# Patient Record
Sex: MALE | Race: BLACK OR AFRICAN AMERICAN
[De-identification: names, ages, dates, MRNs, and addresses within clinical notes are randomized per-mention and may not be internally consistent; named-entity substitution may affect disease eponyms.]

---

## 2017-01-26 ENCOUNTER — HOSPITAL ENCOUNTER (EMERGENCY)
Dept: HOSPITAL 61 - ER | Age: 47
Discharge: HOME | End: 2017-01-26
Payer: MEDICARE

## 2017-01-26 VITALS — SYSTOLIC BLOOD PRESSURE: 146 MMHG | DIASTOLIC BLOOD PRESSURE: 85 MMHG

## 2017-01-26 DIAGNOSIS — F17.210: ICD-10-CM

## 2017-01-26 DIAGNOSIS — Z11.3: Primary | ICD-10-CM

## 2017-01-26 DIAGNOSIS — R20.2: ICD-10-CM

## 2017-01-26 LAB
BACTERIA #/AREA URNS HPF: 0 /HPF
BILIRUB UR QL STRIP: NEGATIVE
GLUCOSE UR STRIP-MCNC: NEGATIVE MG/DL
NITRITE UR QL STRIP: NEGATIVE
PH UR STRIP: 6 [PH]
PROT UR STRIP-MCNC: NEGATIVE MG/DL
RBC #/AREA URNS HPF: (no result) /HPF (ref 0–2)
SP GR UR STRIP: 1.02
UROBILINOGEN UR-MCNC: 1 MG/DL
WBC #/AREA URNS HPF: (no result) /HPF (ref 0–4)

## 2017-01-26 PROCEDURE — 87591 N.GONORRHOEAE DNA AMP PROB: CPT

## 2017-01-26 PROCEDURE — 96372 THER/PROPH/DIAG INJ SC/IM: CPT

## 2017-01-26 PROCEDURE — 81001 URINALYSIS AUTO W/SCOPE: CPT

## 2017-01-26 PROCEDURE — 87491 CHLMYD TRACH DNA AMP PROBE: CPT

## 2017-01-26 PROCEDURE — 99284 EMERGENCY DEPT VISIT MOD MDM: CPT

## 2017-01-26 NOTE — PHYS DOC
Past Medical History


Past Medical History:  No Pertinent History


Past Surgical History:  No Surgical History


Additional Information:  


1 CIGAR PER DAY


Alcohol Use:  Occasionally


Drug Use:  None





Adult General


Chief Complaint


Chief Complaint:  SEXUALLY TRANSMITTED DISEASE





HPI


HPI


Patient is a 46  year old male with history of trichomonas who presents today 

concerned he could have an STD. He states he  has had burning and tingling in 

his penis area for a couple days. Patient denies any fever.





Review of Systems


Review of Systems





Constitutional: Denies fever or chills []


GI: Denies abdominal pain, nausea, vomiting, bloody stools or diarrhea []


: Penis burning and tingling


Musculoskeletal: Denies back pain or joint pain []


Integument: Denies rash or skin lesions []


Neurologic: Denies headache, focal weakness or sensory changes []


Endocrine: Denies polyuria or polydipsia []





Current Medications


Current Medications





 Current Medications








 Medications


  (Trade)  Dose


 Ordered  Sig/Anum  Start Time


 Stop Time Status Last Admin


Dose Admin


 


 Azithromycin


  (Zithromax)  1,000 mg  1X  ONCE  1/26/17 17:45


 1/26/17 18:02 DC  


 


 


 Ceftriaxone Sodium


  (Rocephin Im)  250 mg  1X  ONCE  1/26/17 17:45


 1/26/17 18:02 DC  


 


 


 Metronidazole


  (Flagyl)  2,000 mg  1X  ONCE  1/26/17 17:45


 1/26/17 18:02 DC  


 











Allergies


Allergies





 Allergies








Coded Allergies Type Severity Reaction Last Updated Verified


 


  No Known Drug Allergies    1/26/17 No











Physical Exam


Physical Exam





Constitutional: Well developed, well nourished, no acute distress, non-toxic 

appearance. []


Abdomen: Bowel sounds normal, soft, no tenderness, no masses, no pulsatile 

masses. [] 


Skin: Warm, dry, no erythema, no rash. [] 


Back: No tenderness, no CVA tenderness. [] 


Extremities: No tenderness, no cyanosis, no clubbing, ROM intact, no edema. [] 


Neurologic: Alert and oriented X 3, normal motor function, normal sensory 

function, no focal deficits noted. []


Psychologic: Affect normal, judgement normal, mood normal. []





Current Patient Data


Vital Signs





 Vital Signs








  Date Time  Temp Pulse Resp B/P Pulse Ox O2 Delivery O2 Flow Rate FiO2


 


1/26/17 17:52 98.3 97 18  98 Room Air  





 98.3       








Lab Values





 Laboratory Tests








Test


  1/26/17


17:45


 


Urine Collection Type Unknown  


 


Urine Color Yellow  


 


Urine Clarity Clear  


 


Urine pH 6.0  


 


Urine Specific Gravity 1.025  


 


Urine Protein


  Negativemg/dL


(NEG-TRACE)


 


Urine Glucose (UA)


  Negativemg/dL


(NEG)


 


Urine Ketones (Stick)


  Negativemg/dL


(NEG)


 


Urine Blood


  Negative (NEG)


 


 


Urine Nitrite


  Negative (NEG)


 


 


Urine Bilirubin


  Negative (NEG)


 


 


Urine Urobilinogen Dipstick


  1.0mg/dL (0.2


mg/dL)


 


Urine Leukocyte Esterase


  Negative (NEG)


 


 


Urine RBC Occ/HPF (0-2)  


 


Urine WBC Occ/HPF (0-4)  


 


Urine Bacteria 0/HPF (0-FEW)  


 


Urine Mucus Mod/LPF  











EKG


EKG


[]





Radiology/Procedures


Radiology/Procedures


[]





Course & Med Decision Making


Course & Med Decision Making


Pertinent Labs and Imaging studies reviewed. (See chart for details)





Patient is in the ED with STD concerns. He was treated with Flagyl Rocephin and 

azithromycin. Educated on safe sex practices especially the need to use 

protection at all times. Instructed to contact all his sex partners let them 

know he was treated for STDs and ask them to seek protection. Follow-up with 

the health department for further concerns.





Dragon Disclaimer


Dragon Disclaimer


This electronic medical record was generated, in whole or in part, using a 

voice recognition dictation system.





Departure


Departure


Impression:  


 Primary Impression:  


 Concern about STD in male without diagnosis


Disposition:  01 HOME, SELF-CARE


Condition:  STABLE


Referrals:  


MAX WILKINS (PCP)


 Follow up with the health department.


Patient Instructions:  Sexually Transmitted Disease








NANCY ORDAZ Jan 26, 2017 18:17

## 2017-06-24 ENCOUNTER — HOSPITAL ENCOUNTER (EMERGENCY)
Dept: HOSPITAL 61 - ER | Age: 47
Discharge: HOME | End: 2017-06-24
Payer: MEDICARE

## 2017-06-24 VITALS — WEIGHT: 250 LBS | HEIGHT: 76 IN | BODY MASS INDEX: 30.44 KG/M2

## 2017-06-24 VITALS — DIASTOLIC BLOOD PRESSURE: 86 MMHG | SYSTOLIC BLOOD PRESSURE: 126 MMHG

## 2017-06-24 DIAGNOSIS — R10.84: Primary | ICD-10-CM

## 2017-06-24 DIAGNOSIS — R10.30: ICD-10-CM

## 2017-06-24 DIAGNOSIS — R19.7: ICD-10-CM

## 2017-06-24 LAB
ALBUMIN SERPL-MCNC: 3.9 G/DL (ref 3.4–5)
ALBUMIN/GLOB SERPL: 1 {RATIO} (ref 1–1.7)
ALP SERPL-CCNC: 84 U/L (ref 46–116)
ALT SERPL-CCNC: 30 U/L (ref 16–63)
ANION GAP SERPL CALC-SCNC: 10 MMOL/L (ref 6–14)
AST SERPL-CCNC: 24 U/L (ref 15–37)
BACTERIA #/AREA URNS HPF: 0 /HPF
BASOPHILS # BLD AUTO: 0 X10^3/UL (ref 0–0.2)
BASOPHILS NFR BLD: 0 % (ref 0–3)
BILIRUB SERPL-MCNC: 0.5 MG/DL (ref 0.2–1)
BILIRUB UR QL STRIP: NEGATIVE
BUN SERPL-MCNC: 8 MG/DL (ref 8–26)
BUN/CREAT SERPL: 6 (ref 6–20)
CALCIUM SERPL-MCNC: 9.9 MG/DL (ref 8.5–10.1)
CHLORIDE SERPL-SCNC: 104 MMOL/L (ref 98–107)
CO2 SERPL-SCNC: 27 MMOL/L (ref 21–32)
CREAT SERPL-MCNC: 1.3 MG/DL (ref 0.7–1.3)
EOSINOPHIL NFR BLD: 1 % (ref 0–3)
ERYTHROCYTE [DISTWIDTH] IN BLOOD BY AUTOMATED COUNT: 13.9 % (ref 11.5–14.5)
GFR SERPLBLD BASED ON 1.73 SQ M-ARVRAT: 71.9 ML/MIN
GLOBULIN SER-MCNC: 3.9 G/DL (ref 2.2–3.8)
GLUCOSE SERPL-MCNC: 74 MG/DL (ref 70–99)
GLUCOSE UR STRIP-MCNC: NEGATIVE MG/DL
HCT VFR BLD CALC: 44.2 % (ref 39–53)
HGB BLD-MCNC: 15 G/DL (ref 13–17.5)
LYMPHOCYTES # BLD: 1.9 X10^3/UL (ref 1–4.8)
LYMPHOCYTES NFR BLD AUTO: 16 % (ref 24–48)
MCH RBC QN AUTO: 35 PG (ref 25–35)
MCHC RBC AUTO-ENTMCNC: 34 G/DL (ref 31–37)
MCV RBC AUTO: 103 FL (ref 79–100)
MONOCYTES NFR BLD: 9 % (ref 0–9)
NEUTROPHILS NFR BLD AUTO: 74 % (ref 31–73)
NITRITE UR QL STRIP: NEGATIVE
PH UR STRIP: 6 [PH]
PLATELET # BLD AUTO: 172 X10^3/UL (ref 140–400)
POTASSIUM SERPL-SCNC: 3.5 MMOL/L (ref 3.5–5.1)
PROT SERPL-MCNC: 7.8 G/DL (ref 6.4–8.2)
PROT UR STRIP-MCNC: NEGATIVE MG/DL
RBC # BLD AUTO: 4.29 X10^6/UL (ref 4.3–5.7)
RBC #/AREA URNS HPF: 0 /HPF (ref 0–2)
SODIUM SERPL-SCNC: 141 MMOL/L (ref 136–145)
SP GR UR STRIP: <=1.005
SQUAMOUS #/AREA URNS LPF: (no result) /LPF
UROBILINOGEN UR-MCNC: 0.2 MG/DL
WBC # BLD AUTO: 11.8 X10^3/UL (ref 4–11)
WBC #/AREA URNS HPF: 0 /HPF (ref 0–4)

## 2017-06-24 PROCEDURE — 96361 HYDRATE IV INFUSION ADD-ON: CPT

## 2017-06-24 PROCEDURE — 87324 CLOSTRIDIUM AG IA: CPT

## 2017-06-24 PROCEDURE — 96376 TX/PRO/DX INJ SAME DRUG ADON: CPT

## 2017-06-24 PROCEDURE — 80053 COMPREHEN METABOLIC PANEL: CPT

## 2017-06-24 PROCEDURE — 81001 URINALYSIS AUTO W/SCOPE: CPT

## 2017-06-24 PROCEDURE — 36415 COLL VENOUS BLD VENIPUNCTURE: CPT

## 2017-06-24 PROCEDURE — 87045 FECES CULTURE AEROBIC BACT: CPT

## 2017-06-24 PROCEDURE — 85027 COMPLETE CBC AUTOMATED: CPT

## 2017-06-24 PROCEDURE — 96374 THER/PROPH/DIAG INJ IV PUSH: CPT

## 2017-06-24 PROCEDURE — 83690 ASSAY OF LIPASE: CPT

## 2017-06-24 PROCEDURE — 99285 EMERGENCY DEPT VISIT HI MDM: CPT

## 2017-06-24 PROCEDURE — 96375 TX/PRO/DX INJ NEW DRUG ADDON: CPT

## 2017-06-24 RX ADMIN — FENTANYL CITRATE PRN MCG: 50 INJECTION INTRAMUSCULAR; INTRAVENOUS at 20:32

## 2017-06-24 RX ADMIN — FENTANYL CITRATE PRN MCG: 50 INJECTION INTRAMUSCULAR; INTRAVENOUS at 21:54

## 2017-06-24 NOTE — PHYS DOC
Past Medical History


Past Medical History:  No Pertinent History


Past Surgical History:  No Surgical History


Alcohol Use:  Occasionally


Drug Use:  None





Adult General


Chief Complaint


Chief Complaint:  ABDOMINAL PAIN





HPI


HPI





Patient is a 47  year old male who presents with abdominal pain and diarrhea. 

The patient reports one week history of frequent loose bowel movements 

associated with lower abdominal pain and cramping. Reports nausea. Denies 

fevers or chills, vomiting, hematemesis, hematochezia or melena, dysuria or 

hematuria. Denies history of abdominal surgeries. Previously healthy. States he 

was seen at [facility] at time of onset of symptoms, given 

unknown diagnosis and told to take Cipro but states symptoms have not improved. 

Does not have a PCP.





Review of Systems


Review of Systems


Constitutional: Denies fever or chills 


Eyes: Denies change in visual acuity


HENT: Denies nasal congestion or sore throat 


Respiratory: Denies cough or shortness of breath 


Cardiovascular:  Denies chest pain or edema


GI: Reports abdominal pain, nausea, and diarrhea, denies vomiting, bloody 

stools 


: Denies dysuria or hematuria 


Musculoskeletal: Denies back pain or joint pain 


Integument: Denies rash or skin lesions 


Neurologic: Denies headache, focal weakness or sensory changes





Current Medications


Current Medications





Current Medications








 Medications


  (Trade)  Dose


 Ordered  Sig/Anum  Start Time


 Stop Time Status Last Admin


Dose Admin


 


 Fentanyl Citrate


  (Fentanyl 2ml


 Vial)  50 mcg  PRN Q15MIN  PRN  6/24/17 20:15


 6/25/17 20:14  6/24/17 21:54


50 MCG


 


 Info


  (Do NOT chart on


 this entry -- for


 MONITORING)  1 each  PRN DAILY  PRN  6/24/17 21:30


 6/26/17 21:29   


 


 


 Iohexol


  (Omnipaque 300


 Mg/ml)  75 ml  1X  ONCE  6/24/17 21:45


 6/24/17 21:46 DC  


 


 


 Ondansetron HCl


  (Zofran)  4 mg  1X  ONCE  6/24/17 20:30


 6/24/17 20:31 DC 6/24/17 20:29


4 MG


 


 Sodium Chloride  1,000 ml @ 


 1,000 mls/hr  1X  ONCE  6/24/17 20:30


 6/24/17 21:29 DC 6/24/17 20:33


1,000 MLS/HR











Allergies


Allergies





Allergies








Coded Allergies Type Severity Reaction Last Updated Verified


 


  No Known Drug Allergies    1/26/17 No











Physical Exam


Physical Exam


Constitutional: Well developed, well nourished, no acute distress, non-toxic 

appearance. 


HENT: Normocephalic, atraumatic, bilateral external ears normal, oropharynx 

moist, nose normal.


Eyes: PERRLA, EOMI, conjunctiva normal, no discharge.


Neck: supple, no stridor.


Cardiovascular:  RRR, no murmurs, no edema. 


Lungs & Thorax:  LCTAB, no wheezing, no respiratory distress.


Abdomen: soft, generalized lower abdominal tenderness without rebound or 

guarding, no masses or pulsatile masses, nondistended.


Skin: Warm, dry, no erythema, no rash.


Back: No CVA tenderness.


Extremities: No tenderness, no edema. 


Neurologic: Alert and oriented X 3, no focal deficits noted. 


Psychologic: Affect normal, judgement normal, mood normal.





Current Patient Data


Vital Signs





 Vital Signs








  Date Time  Temp Pulse Resp B/P (MAP) Pulse Ox O2 Delivery O2 Flow Rate FiO2


 


6/24/17 21:54   20  97 Room Air  


 


6/24/17 21:00  83  127/81 (96)    


 


6/24/17 19:45 98.6       





 98.6       








Lab Values





 Laboratory Tests








Test


  6/24/17


20:23 6/24/17


20:25


 


Urine Collection Type Unknown   


 


Urine Color Yellow   


 


Urine Clarity Clear   


 


Urine pH 6.0   


 


Urine Specific Gravity <=1.005   


 


Urine Protein


  Negative mg/dL


(NEG-TRACE) 


 


 


Urine Glucose (UA)


  Negative mg/dL


(NEG) 


 


 


Urine Ketones (Stick)


  Negative mg/dL


(NEG) 


 


 


Urine Blood


  Negative (NEG)


  


 


 


Urine Nitrite


  Negative (NEG)


  


 


 


Urine Bilirubin


  Negative (NEG)


  


 


 


Urine Urobilinogen Dipstick


  0.2 mg/dL (0.2


mg/dL) 


 


 


Urine Leukocyte Esterase


  Negative (NEG)


  


 


 


Urine RBC 0 /HPF (0-2)   


 


Urine WBC 0 /HPF (0-4)   


 


Urine Squamous Epithelial


Cells Few /LPF  


  


 


 


Urine Bacteria


  0 /HPF (0-FEW)


  


 


 


Urine Mucus Slight /LPF   


 


White Blood Count


  


  11.8 x10^3/uL


(4.0-11.0)  H


 


Red Blood Count


  


  4.29 x10^6/uL


(4.30-5.70)  L


 


Hemoglobin


  


  15.0 g/dL


(13.0-17.5)


 


Hematocrit


  


  44.2 %


(39.0-53.0)


 


Mean Corpuscular Volume


  


  103 fL


()  H


 


Mean Corpuscular Hemoglobin  35 pg (25-35)  


 


Mean Corpuscular Hemoglobin


Concent 


  34 g/dL


(31-37)


 


Red Cell Distribution Width


  


  13.9 %


(11.5-14.5)


 


Platelet Count


  


  172 x10^3/uL


(140-400)


 


Neutrophils (%) (Auto)  74 % (31-73)  H


 


Lymphocytes (%) (Auto)  16 % (24-48)  L


 


Monocytes (%) (Auto)  9 % (0-9)  


 


Eosinophils (%) (Auto)  1 % (0-3)  


 


Basophils (%) (Auto)  0 % (0-3)  


 


Neutrophils # (Auto)


  


  8.7 x10^3uL


(1.8-7.7)  H


 


Lymphocytes # (Auto)


  


  1.9 x10^3/uL


(1.0-4.8)


 


Monocytes # (Auto)


  


  1.0 x10^3/uL


(0.0-1.1)


 


Eosinophils # (Auto)


  


  0.1 x10^3/uL


(0.0-0.7)


 


Basophils # (Auto)


  


  0.0 x10^3/uL


(0.0-0.2)


 


Sodium Level


  


  141 mmol/L


(136-145)


 


Potassium Level


  


  3.5 mmol/L


(3.5-5.1)


 


Chloride Level


  


  104 mmol/L


()


 


Carbon Dioxide Level


  


  27 mmol/L


(21-32)


 


Anion Gap  10 (6-14)  


 


Blood Urea Nitrogen


  


  8 mg/dL (8-26)


 


 


Creatinine


  


  1.3 mg/dL


(0.7-1.3)


 


Estimated GFR


(Cockcroft-Gault) 


  71.9  


 


 


BUN/Creatinine Ratio  6 (6-20)  


 


Glucose Level


  


  74 mg/dL


(70-99)


 


Calcium Level


  


  9.9 mg/dL


(8.5-10.1)


 


Total Bilirubin


  


  0.5 mg/dL


(0.2-1.0)


 


Aspartate Amino Transferase


(AST) 


  24 U/L (15-37)


 


 


Alanine Aminotransferase (ALT)


  


  30 U/L (16-63)


 


 


Alkaline Phosphatase


  


  84 U/L


()


 


Total Protein


  


  7.8 g/dL


(6.4-8.2)


 


Albumin


  


  3.9 g/dL


(3.4-5.0)


 


Albumin/Globulin Ratio  1.0 (1.0-1.7)  


 


Lipase


  


  90 U/L


()





 Laboratory Tests


6/24/17 20:25








 Laboratory Tests


6/24/17 20:25














Course & Med Decision Making


Course & Med Decision Making


Pertinent Labs and Imaging studies reviewed. (See chart for details)


The patient presents with diarrhea and abdominal pain. Afebrile here with 

stable vital signs. Gave IV fluids, Zofran, pain medication. Attempted to 

obtain records from [facility]. Received a response back 

that they have no records for patient by this name. Labs as above with 

leukocytosis otherwise no significant electrolyte abnormalities. The patient 

provided a stool sample which is sent for culture. Ordered CT of the abdomen 

and pelvis which is pending at the end of my shift. Care transferred to Dr. Beal to follow-up results and disposition patient accordingly. I anticipate he 

can be discharged home with supportive treatment unless a significant finding 

identified. He is in stable condition at the end of my shift.


Geraldo Montez MD








Accepted care from Dr. Monetz at shift change.  Results for CT abd/pelvis 

with Oral and IV contrast dated 6/21/17 were received from [facility] after further 

attempt.  Results show no acute intra-abdominal or pelvic process, normal 

appendix, normal caliber bowel, normal vascular structures, no gallstones, no 

hydronephrosis.  His laboratory evaluation is unremarkable. Stool studies 

pending at this time.  He is feeling better after medications. Thus, imaging 

was canceled for this evaluation. Discussed supportive care and trial of 

flagyl.  Return precautions given.  He understands and agrees with plan.  -

MD Alexis Odell Disclaimer


Alexis Disclaimer


This electronic medical record was generated, in whole or in part, using a 

voice recognition dictation system.





Departure


Departure


Impression:  


 Primary Impression:  


 Abdominal pain


 Additional Impression:  


 Diarrhea


Disposition:  01 HOME, SELF-CARE


Admitting Physician:  Bijal Phillips


Condition:  STABLE


Referrals:  


MAX WILKINS (PCP)


Patient Instructions:  Abdominal Pain, Easy-to-Read, Diet for Diarrhea, Adult





Additional Instructions:  


Take Flagyl to help with diarrhea. Take bentyl to help with abdominal pain.  

Take Tylenol or ibuprofen as needed for moderate pain. Take hydrocodone as 

needed for severe pain. Do not drink, drive or operate heavy machinery after 

taking hydrocodone as it may make you sleepy. Follow-up with your primary care 

doctor. Return for any concerns.


Scripts


Dicyclomine Hcl (BENTYL) 10 Mg Capsule


1 CAP PO TID, #30 CAP 0 Refills


   Prov: ANAM BEAL MD         6/24/17 


Hydrocodone/Ibuprofen (HYDROCODONE-IBUPROFEN 5-200 MG) 1 Each Tablet


1-2 TAB PO PRN Q6HRS Y for PAIN, #10 TAB 0 Refills


   Prov: ANAM BEAL MD         6/24/17 


Metronidazole (FLAGYL) 500 Mg Tablet


1 TAB PO BID, #20 TAB


   Prov: ANAM BEAL MD         6/24/17





Problem Qualifiers








 Primary Impression:  


 Abdominal pain


 Abdominal location:  unspecified location  Qualified Codes:  R10.9 - 

Unspecified abdominal pain


 Additional Impression:  


 Diarrhea


 Diarrhea type:  presumed infectious  Qualified Codes:  A09 - Infectious 

gastroenteritis and colitis, unspecified








GERALDO MONTEZ MD Jun 24, 2017 21:02


ANAM BEAL MD Jun 24, 2017 22:23

## 2018-04-24 ENCOUNTER — HOSPITAL ENCOUNTER (EMERGENCY)
Dept: HOSPITAL 61 - ER | Age: 48
Discharge: HOME | End: 2018-04-24
Payer: MEDICARE

## 2018-04-24 DIAGNOSIS — Z98.890: ICD-10-CM

## 2018-04-24 DIAGNOSIS — M54.5: Primary | ICD-10-CM

## 2018-04-24 PROCEDURE — 96372 THER/PROPH/DIAG INJ SC/IM: CPT

## 2018-04-24 PROCEDURE — 99283 EMERGENCY DEPT VISIT LOW MDM: CPT

## 2018-04-24 RX ADMIN — LIDOCAINE 1 PATCH: 50 PATCH CUTANEOUS at 11:43

## 2018-04-24 RX ADMIN — KETOROLAC TROMETHAMINE 1 MG: 30 INJECTION, SOLUTION INTRAMUSCULAR at 11:44

## 2018-06-07 ENCOUNTER — HOSPITAL ENCOUNTER (EMERGENCY)
Dept: HOSPITAL 61 - ER | Age: 48
Discharge: HOME | End: 2018-06-07
Payer: MEDICARE

## 2018-06-07 DIAGNOSIS — M54.5: ICD-10-CM

## 2018-06-07 DIAGNOSIS — G89.29: Primary | ICD-10-CM

## 2018-06-07 PROCEDURE — 99283 EMERGENCY DEPT VISIT LOW MDM: CPT

## 2018-06-07 PROCEDURE — 96372 THER/PROPH/DIAG INJ SC/IM: CPT

## 2018-06-07 RX ADMIN — MORPHINE SULFATE 1 MG: 10 INJECTION INTRAMUSCULAR; INTRAVENOUS; SUBCUTANEOUS at 10:24

## 2020-05-13 ENCOUNTER — HOSPITAL ENCOUNTER (OUTPATIENT)
Dept: HOSPITAL 35 - PAIN | Age: 50
End: 2020-05-13
Payer: COMMERCIAL

## 2020-05-13 VITALS — DIASTOLIC BLOOD PRESSURE: 91 MMHG | SYSTOLIC BLOOD PRESSURE: 134 MMHG

## 2020-05-13 VITALS — BODY MASS INDEX: 31.66 KG/M2 | WEIGHT: 260 LBS | HEIGHT: 75.98 IN

## 2020-05-13 DIAGNOSIS — M54.17: Primary | ICD-10-CM

## 2020-05-13 DIAGNOSIS — F11.20: ICD-10-CM

## 2020-05-13 DIAGNOSIS — Z79.899: ICD-10-CM

## 2020-05-13 DIAGNOSIS — F17.210: ICD-10-CM

## 2020-05-13 DIAGNOSIS — I10: ICD-10-CM

## 2020-05-13 NOTE — NUR
Pain Clinic Assessment:
 
1. History of Osteoarthritis:
BACK
   History of Rheumatoid Arthritis:
Not Applicable
 
2. Height: 6 ft. 4 in. 193.0 cm.
   Weight: 260.0 lb.  oz. 117.936 kg.
   Patient's BMI: 31.7
 
3. Vital Signs:
   BP: 134/91 Pulse: 78 Resp: 16
   Temp:  02 Sat: 100 ECG Mon:
 
4. Pain Intensity: 5
 
5. Fall Risk:
   Dizziness: N  Needs help standing or walking: N
   Fallen in the last 3 months: N
   Fall risk comments:
 
 
6. Patient on Blood Thinner: None
 
7. History of Hypertension: N
 
8. Opioid Therapy greater than 6 weeks: N
   Opiate Contract Signed:
 
9. Risk Assessment Tool Provided: LOW RISK 0/3
 
10. Functional Assessment Tool: 48/70
 
11. Recreational Drug Use: Never Drug Type:
    Tobacco Use: Current Some Day Smoker Tobacco Type: Cigars
       Amount or Packs/day: 1-2 PER WEEK How Many Years: 10
    Alcohol Use: Yes  Frequency: Weekly Quant: 1-2

## 2020-05-20 NOTE — HPC
[facility]
[address]
[place]   [address]                     PAIN MANAGEMENT CONSULTATION  
_______________________________________________________________________________
 
Name:       TOO ZENG                    Room #:                     REG KELLIE 
BHARAT#:      [id number]                       Account #:      [id number]  
Admission:  05/13/20    Attend Phys:    FANY Arauz MD    
Discharge:              Date of Birth:  [birth year]  
                                                          Report #: [id number]
                                                                    [id number]   
_______________________________________________________________________________
THIS REPORT FOR:  
 
cc:  Kwabena Nelson MD, Brian G. MD Brown,FANY Merrill MD                                            ~
CC: Kwabena Arauz
 
DATE OF SERVICE:  05/13/2020
 
 
CHIEF COMPLAINT:  "Low back pain with pain down into my leg."
 
HISTORY:  The patient is a 50-year-old gentleman, who has been referred to the
Pain Clinic because of back pain.  He has had pain, which has been problematic
since 2018.  He has been experiencing more lower back pain and discomfort. 
Notes that the pain is worse when he is standing.  He has pain when he is lying
down.  He has, in the past, undergone epidural steroid injections and found them
beneficial.  He has history of disk disease at a number of levels, they involve
the facet, there is arthritis in these areas.  He also has some narrowing in the
lower portion of his spine, which places pressure on the nerves.  He denies any
new problems with bowel or bladder function.  He has tried muscle relaxants.  He
has used nonsteroidal anti-inflammatory medications.  This episode of pain has
been problematic since 2008.  He initially noticed worsening of his pain in
2011.  He denied any trauma at that juncture.  Epidural steroid injections in
2016 were helpful.  He has undergone chiropractic therapy in the past.  He has
used hot and cold patches.
 
ALLERGIES:  No known drug allergies.
 
CURRENT MEDICATIONS:  Trazodone 100 mg at bedtime, ibuprofen 800 mg.
 
PAST MEDICAL HISTORY:  Generally unremarkable.
 
PAST SURGICAL HISTORY:  No surgical history.
 
SOCIAL HISTORY:  He works as a .  He is working at this
juncture.
 
REVIEW OF SYSTEMS:  Generally good health, wears glasses, numbness and tingling
sensation in his leg.
 
LABORATORY DATA:  MRI of the lumbar spine dated 05/02/2018:
1.  L3-L4, there is no significant posterior disk bulge or herniation.  There is
no spinal canal stenosis.  There is minimal broad-based disk bulging in both
lateral and subarticular zones.  There is mild bilateral neural foraminal
 
 
 
[place] [address]                     PAIN MANAGEMENT CONSULTATION  
_______________________________________________________________________________
 
Name:       AZUCENATOO                    Room #:                     REG CL 
BHARAT#:      [id number]                       Account #:      [id number]  
Admission:  05/13/20    Attend Phys:    FANY Arauz MD    
Discharge:              Date of Birth:  [birth year]  
                                                          Report #: [id number]
                                                                    [id number]   
_______________________________________________________________________________
stenosis.
2.  L4-L5, there is no significant posterior disk bulge or herniation.  There is
no spinal canal stenosis.  There is minimal broad-based disk bulging in both
lateral and subarticular zones.  Mild right facet arthrosis.  There is mild
bilateral neural foraminal stenosis.
3.  L5-S1, mild to moderate diffuse disk bulge and disk osteophyte complex.  No
spinal canal stenosis.  Mild bilateral facet arthrosis.  Moderate left and
moderate to severe right neural foraminal stenosis.
 
PAIN CLINIC ASSESSMENT AND PQRS:
1.  The patient has some osteoarthritic changes in his back.  He is not being
treated for rheumatoid arthritis.
2.  Height 6 feet 4 inches, weight 260 pounds, BMI is 31.7.
3.  Vital signs:  Blood pressure 134/91, heart rate 78, respiratory rate 16,
room air saturation 100%.
4.  Pain intensity:  5/10.
5.  Fall history:  The patient has not fallen.
6.  Blood thinner:  The patient is not on a blood thinning medication.
7.  Hypertension:  The patient is not being treated for hypertension.
8.  Opioids greater than 6 weeks:  The patient is not on an opioid regimen.
9.  Risk assessment tool:  Low for opioid use.
10.  Functional assessment tool:  48/70.
11.  Recreational drug use:  The patient denies.
12.  Tobacco:  The patient smokes 1-2 cigars per week.  He has smoked for about
10 years.
13.  Alcohol:  The patient drinks 1-2 alcoholic beverages weekly.
 
PHYSICAL EXAMINATION:
GENERAL:  The patient is a well-developed, well-nourished, black male.  He
appears his stated age.  He is alert and oriented x 3.  His affect is
appropriate.  Speech is fluent.
HEENT:  Normocephalic, atraumatic.  Extraocular eye muscles intact.  Sclerae
nonicteric.  Mucous membranes are moist.
NECK:  Without adenopathy or JVD.  The patient has a mask in place.
HEART:  Regular rate.
CHEST:  Clear to auscultation.
ABDOMEN:  Nontender.
MUSCULOSKELETAL:  Upper extremity muscle strength is judged to be 5/5 for the
major muscle groups in the upper extremity.  Lower extremity, the patient's
muscle strength is judged to be 5/5 for the major muscle groups in the lower
extremity.  The patient has pain and discomfort in the lower portion of his back
with pain radiating down the L5-S1 dermatomal distribution on the right leg.  He
notes some perception of weakness, some numbness and tingling sensory changes.
 
IMPRESSION:  Lumbar radiculopathy with L5-S1 dermatomal distribution.
 
 
 
 
[facility]
[address] [place]   [address]                     PAIN MANAGEMENT CONSULTATION  
_______________________________________________________________________________
 
Name:       TOO ZENG                    Room #:                     REG CLI 
BHARAT#:      [id number]                       Account #:      [id number]  
Admission:  05/13/20    Attend Phys:    FANY Arauz MD    
Discharge:              Date of Birth:  [birth year]  
                                                          Report #: [id number]
                                                                    [id number]   
_______________________________________________________________________________
RECOMMENDATIONS:  We discussed treatment options with the patient.  The patient
has undergone epidural steroid injections in the past, they have been
beneficial.  He will return to the Pain Clinic, at which time he would then
undergo an epidural steroid injection.  Risks and benefits of the procedure were
discussed with the patient, we will review these possible complications.
 
We would like to thank you for letting us participate in his care.  We hope he
continues to improve.
 
 
 
 
 
 
 
 
 
 
 
 
 
 
 
 
 
 
 
 
 
 
 
 
 
 
 
 
 
 
 
 
 
 
 
 
  <ELECTRONICALLY SIGNED>
   By: FANY Arauz MD            
  05/20/20     2235
D: 05/20/20 0815                           _____________________________________
T: 05/20/20 1215                           FANY Arauz MD              /nt

## 2020-05-29 ENCOUNTER — HOSPITAL ENCOUNTER (OUTPATIENT)
Dept: HOSPITAL 35 - PAIN | Age: 50
Discharge: HOME | End: 2020-05-29
Payer: COMMERCIAL

## 2020-05-29 VITALS — SYSTOLIC BLOOD PRESSURE: 149 MMHG | DIASTOLIC BLOOD PRESSURE: 79 MMHG

## 2020-05-29 VITALS — BODY MASS INDEX: 34.78 KG/M2 | WEIGHT: 285.6 LBS | HEIGHT: 75.98 IN

## 2020-05-29 DIAGNOSIS — Z79.899: ICD-10-CM

## 2020-05-29 DIAGNOSIS — G89.29: ICD-10-CM

## 2020-05-29 DIAGNOSIS — M54.16: Primary | ICD-10-CM

## 2020-05-29 DIAGNOSIS — Z98.890: ICD-10-CM

## 2020-05-29 DIAGNOSIS — F17.210: ICD-10-CM

## 2020-05-29 NOTE — NUR
Pain Clinic Assessment:
 
1. History of Osteoarthritis:
BACK
   History of Rheumatoid Arthritis:
Not Applicable
 
2. Height: 6 ft. 4 in. 193.0 cm.
   Weight: 285.6 lb.  oz. 129.548 kg.
   Patient's BMI: 34.8
 
3. Vital Signs:
   BP: 149/79 Pulse: 89 Resp: 18
   Temp:  02 Sat: 99 ECG Mon:
 
4. Pain Intensity: 6
 
5. Fall Risk:
   Dizziness: N  Needs help standing or walking: N
   Fallen in the last 3 months: N
   Fall risk comments:
 
 
6. Patient on Blood Thinner: None
 
7. History of Hypertension: N
 
8. Opioid Therapy greater than 6 weeks: N
   Opiate Contract Signed:
 
9. Risk Assessment Tool Provided: LOW RISK 0/3
 
10. Functional Assessment Tool: 48/70
 
11. Recreational Drug Use: Never Drug Type:
    Tobacco Use: Current Some Day Smoker Tobacco Type:
       Amount or Packs/day:  How Many Years:
    Alcohol Use: Yes  Frequency:  Quant:

## 2020-05-29 NOTE — HPC
[facility]
[address]
[place]   [address]                     PAIN MANAGEMENT CONSULTATION  
_______________________________________________________________________________
 
Name:       TOO ZENG                    Room #:                     REG KELLIE CASTELLONShielaLAYLAShiela#:      [id number]                       Account #:      [id number]  
Admission:  05/29/20    Attend Phys:    FANY Arauz MD    
Discharge:              Date of Birth:  [birth year]  
                                                          Report #: [id number]
                                                                    [id number]   
_______________________________________________________________________________
THIS REPORT FOR:  
 
cc:  Kwabena Nelson MD,Kwabena Arauz,FANY Merrill MD                                            ~
CC: Kwabena Arauz
 
DATE OF SERVICE:  05/29/2020
 
 
PRIMARY CARE PHYSICIAN:  Kwabena Nelson MD
 
CHIEF COMPLAINT:  "Low back pain down into my leg."
 
HISTORY OF PRESENT ILLNESS:  The patient is a 50-year-old gentleman who has been
referred to the pain clinic because of lumbar radiculopathy.  He has undergone
epidural steroid injections in the past.  He has noticed a recurrence of pain
and discomfort in his low back area.  He is experiencing pain that is radiating
down into his leg on the right side.  It involves his right buttocks as well. 
He describes it as an aching discomfort, which is constant.  Standing, walking
and activities of daily living can exacerbate his discomfort.  He notes that it
can be quite problematic in the morning.  Pain is improved sometimes when he
lies down.  He has undergone epidural steroid injections and found them
beneficial.  He has returned today for an epidural steroid injection in the pain
clinic.
 
ALLERGIES:  No known drug allergies.
 
PAIN CLINIC ASSESSMENT/PQRS:
1.  The patient has some osteoarthritic changes in his back.  He is not being
treated for rheumatoid arthritis.
2.  Height 6 feet 4 inches, weight 285 pounds, BMI is 34.
3.  Vital Signs:  Blood pressure 149/70, pulse 89, respiratory rate 18, room air
saturation 99%.
4.  Pain intensity 6/10.
5.  Fall history:  The patient has not fallen in the last 3 months.
6.  Blood thinner.  The patient is not on a blood thinning medication.
7.  Hypertension.  The patient is not being treated for hypertension.
8.  Opioids greater than 6 weeks.  The patient receives his medication from one
source.
9.  Risk assessment tool, low for opioid use.
10.  Functional assessment tool 48 of 70.
11.  Recreational drug use.  The patient denies.
12.  Tobacco.  The patient smokes 1-2 cigars per week.  He has smoked for about
10 years.
 
 
 
[address]   [address]                     PAIN MANAGEMENT CONSULTATION  
_______________________________________________________________________________
 
Name:       TOO ZENG                    Room #:                     REG [facility]#:      [id number]                       Account #:      [id number]  
Admission:  05/29/20    Attend Phys:    FANY Arauz MD    
Discharge:              Date of Birth:  [birth year]  
                                                          Report #: [id number]
                                                                    [id number]   
_______________________________________________________________________________
13.  Alcohol.  The patient occasionally drinks alcoholic beverages.
 
PHYSICAL EXAMINATION:
GENERAL:  The patient is a well-developed, well-nourished black male.  He
appears his stated age.  He is alert and oriented x 3.  His affect is
appropriate.  Speech is fluent.
HEENT:  Normocephalic, atraumatic.  Extraocular eye muscles intact.  Sclerae
nonicteric.  Mucous membranes are moist.  He is accompanied by his wife.
NECK:  Without adenopathy or JVD.  The patient has a mask in place.
HEART:  Regular rate.
CHEST:  Clear to auscultation.
ABDOMEN:  Nontender.
MUSCULOSKELETAL:  Upper extremity muscle strength judged to be 5/5 for the major
muscle groups in the upper extremity.  Lower extremity, the patient has muscle
strength judged to be 5/5.  He does complain of pain and discomfort in the lower
portion of his back with pain radiating down the L5-S1 dermatomal distribution
involving the right leg.  He has some perception of weakness, some numbness and
a tingling sensation.
 
IMPRESSION:  Lumbar radiculopathy.
 
RECOMMENDATIONS:  We discussed treatment options with the patient.  We have
discussed the possible complication of the procedure, which could include
infection, worsening pain, no improvement in pain, nerve damage, bleeding,
spinal headache.  We also added the caveat that we are in a COVID-19 pandemic. 
The patient states that he is trying to adhere to the policies from the CDC.  We
have explained to him that steroids can decrease one's immunotic function.  It
can suppress it somewhat.  If he were get infected, he may have more difficult
time with the virus.  He elects to proceed.
 
PROCEDURE NOTE:  The patient was taken to the procedure area.  He was then
assisted in getting on the examination table.  His back was sterilely prepped
with a Betadine solution.  A 0.25% bupivacaine was infiltrated in the midline
area with a right paramedian approach at L5-S1.  This area had been sterilely
prepped and allowed to dry.  The area was anesthetized using a 23-gauge needle
and 0.25% bupivacaine.  Fluoroscopy using anterior, posterior as well as lateral
viewing were implemented.  A 17-gauge Tuohy with loss of resistance technique
was used to gain access to the epidural space.  There was no CSF, heme or
paresthesia.  The patient tolerated the procedure well.  He remained in the pain
clinic for an appropriate amount of time.  He will follow up in the future as
needed.  A fluoro time of about 20 seconds was used.
 
 
 
 
[address]   [address]                     PAIN MANAGEMENT CONSULTATION  
_______________________________________________________________________________
 
Name:       TOO ZENG                    Room #:                     ALEXUS CRUZShiela#:      [id number]                       Account #:      [id number]  
Admission:  05/29/20    Attend Phys:    FANY Arauz MD    
Discharge:              Date of Birth:  [birth year]  
                                                          Report #: [id number]
                                                                    [id number]   
_______________________________________________________________________________
We would like to thank you for letting us participate in his care.  We hope he
continues to improve.
 
 
 
 
 
 
 
 
 
 
 
 
 
 
 
 
 
 
 
 
 
 
 
 
 
 
 
 
 
 
 
 
 
 
 
 
 
 
 
 
 
 
                         
   By:                               
                   
D: 06/01/20 2353                           _____________________________________
T: 06/02/20 0052                           FANY Arauz MD              /XIOMARA

## 2020-09-18 ENCOUNTER — HOSPITAL ENCOUNTER (OUTPATIENT)
Dept: HOSPITAL 35 - PAIN | Age: 50
Discharge: HOME | End: 2020-09-18
Payer: COMMERCIAL

## 2020-09-18 VITALS — BODY MASS INDEX: 33.61 KG/M2 | HEIGHT: 75.98 IN | WEIGHT: 276 LBS

## 2020-09-18 VITALS — DIASTOLIC BLOOD PRESSURE: 89 MMHG | SYSTOLIC BLOOD PRESSURE: 129 MMHG

## 2020-09-18 DIAGNOSIS — Z79.899: ICD-10-CM

## 2020-09-18 DIAGNOSIS — Z98.890: ICD-10-CM

## 2020-09-18 DIAGNOSIS — F17.210: ICD-10-CM

## 2020-09-18 DIAGNOSIS — G89.29: ICD-10-CM

## 2020-09-18 DIAGNOSIS — M54.16: Primary | ICD-10-CM

## 2020-09-18 NOTE — NUR
Pain Clinic Assessment:
 
1. History of Osteoarthritis:
BACK
   History of Rheumatoid Arthritis:
Not Applicable
 
2. Height: 6 ft. 4 in. 193.0 cm.
   Weight: 276.0 lb.  oz. 125.193 kg.
   Patient's BMI: 33.6
 
3. Vital Signs:
   BP: 129/89 Pulse: 83 Resp: 16
   Temp:  02 Sat: 99 ECG Mon:
 
4. Pain Intensity: 7
 
5. Fall Risk:
   Dizziness: N  Needs help standing or walking: N
   Fallen in the last 3 months: N
   Fall risk comments:
 
 
6. Patient on Blood Thinner: None
 
7. History of Hypertension: N
 
8. Opioid Therapy greater than 6 weeks: N
   Opiate Contract Signed:
 
9. Risk Assessment Tool Provided: LOW RISK 0/3
 
10. Functional Assessment Tool: 48/70
 
11. Recreational Drug Use: Never Drug Type:
    Tobacco Use: Current Some Day Smoker Tobacco Type: Cigars
       Amount or Packs/day:  How Many Years:
    Alcohol Use: Yes  Frequency: Weekly Quant: 2 BEERS

## 2020-09-29 NOTE — HPC
[facility]
[place]   [address]                     PAIN MANAGEMENT CONSULTATION  
_______________________________________________________________________________
 
Name:       TOO ZENG                    Room #:                     REG KELLIE 
ANTHONY.#:      [id number]                       Account #:      [id number]  
Admission:  09/18/20    Attend Phys:    FANY Arauz MD    
Discharge:              Date of Birth:  [birth year]  
                                                          Report #: [id number]
                                                                    [id number]   
_______________________________________________________________________________
THIS REPORT FOR:  
 
cc:  Kwabena Nelson MD,Kwabena Arauz,FANY Merrill MD                                            ~
CC: Kwabena Arauz
 
DATE OF SERVICE:  09/18/2020
 
 
CHIEF COMPLAINT:  Return of back and leg pain.
 
HISTORY:  The patient is a 50-year-old gentleman who has been seen in the pain
clinic because of chronic low back pain.  He notes that there is pain involving
the right buttocks.  It radiates down to the right portion of his leg and into
his right foot.  She rates her pain today as a 7/10.  Notes that the pain
started to increase about 3 weeks ago.  Notes that standing, walking are
problematic.  It can be somewhat problematic in the morning.  Lying down as well
as medications have been helpful.  He has returned today for another epidural
steroid injections with the hopes of coiling and decreasing pain and discomfort
he is experiencing.
 
ALLERGIES:  No known drug allergies.
 
CURRENT MEDICATIONS:  Oxycodone 5/325 one p.o. q. 6 hours p.r.n., ibuprofen 800
mg, trazodone 100 mg 1-2 tablets.
 
PAIN CLINIC ASSESSMENT AND PQRS:
1.  The patient is not being treated for rheumatoid arthritis.  He is not being
treated for osteoarthritis.  The patient does have some osteoarthritic changes
in his back.
2.  Height 6 feet 4 inches, weight 276 pounds, BMI 33.
3.  Vital Signs:  Blood pressure 129/89, pulse 83, respiratory rate 16, room air
saturation 99%.
4.  Pain intensity 7/10.
5.  Fall risk.  The patient has not fallen in the last 3 months.
6.  Blood thinner.  The patient is not on a blood thinning medication.
7.  Hypertension.  The patient is not being treated for hypertension.
8.  Opioids greater than 6 weeks.  The patient received medication from the pain
clinic.
9.  Risk assessment tool, low for opioid use.
10.  Functional assessment tool 48 of 70.
11.  Recreational drug use.  The patient denies.
12.  Tobacco:  The patient is a current smoker of cigars.
13.  Alcohol:  The patient drinks about 2 beers weekly.
 
 
 
[facility]
[address] [place]   [address]                     PAIN MANAGEMENT CONSULTATION  
_______________________________________________________________________________
 
Name:       TOO ZENG                    Room #:                     REG [facility] 
RAVINDERShielaLAYLAShiela#:      [id number]                       Account #:      [id number]  
Admission:  09/18/20    Attend Phys:    FANY Arauz MD    
Discharge:              Date of Birth:  [birth year]  
                                                          Report #: [id number]
                                                                    [id number]   
_______________________________________________________________________________
 
PHYSICAL EXAMINATION:
GENERAL:  The patient is a well-developed, well-nourished black male, appears
his stated age.  He is alert and oriented x 3.  His affect is appropriate. 
Speech is fluent.  He is accompanied by his significant other.  Both have facial
covering in place.
HEENT:  Normocephalic, atraumatic.  Extraocular eye muscles intact.  Sclerae
nonicteric.  Mucous membranes are moist.
HEART:  Regular rate.
ABDOMEN:  Nontender.
CHEST:  Clear to auscultation.
EXTREMITIES:  Upper extremity muscle strength judged to be 5/5 for the major
muscle groups in the upper extremity.  The patient has pain and discomfort in
lower portion of his back.  Has some pain in the center portion of his back. 
Has pain that radiates down the lower portion of his back into the right
buttocks and down into the right leg down to the level of his right foot.  There
is numbness and tingling sensation.  Has some perception of weakness.
 
IMPRESSION:  Lumbar radiculopathy, L5-S1 area.
 
RECOMMENDATIONS:  We discussed treatment options with the patient.  Risks and
benefits of an epidural steroid injection were discussed.  Possible
complications of the procedure were reviewed.  They include but are not limited
to infection, worsening of pain, no improvement in pain, nerve damage, bleeding,
headache.  The patient is aware that COVID-19 is planned pandemic.  Steroids can
affect one's immune system with decreasing his immunity.  The patient elects to
proceed.
 
PROCEDURE NOTE:  The patient was taken to the procedure area.  He was then
assisted in getting on examination table.  His back was sterilely prepped with a
Betadine solution.  A 0.25% bupivacaine was infiltrated at the L4-L5 area.  A
25-gauge needle was then used to anesthetize the area.  A 17-gauge Tuohy with
loss of resistance technique was used to gain access to the epidural space. 
There was no CSF, heme or paresthesia.  Total of 80 mg Depo-Medrol, 40 mg
triamcinolone and 2 mL of 0.25% bupivacaine was injected.  The patient's
fluoroscopy time was 6 seconds.  He remained in the pain clinic for an
appropriate amount of time.  He will follow up in the future as needed.
 
We would like to thank you for letting us participate in his care.  We hope he
continues to improve.
 
 
 
 
  <ELECTRONICALLY SIGNED>
   By: FANY Arauz MD            
  09/29/20     1335
D: 09/28/20 1141                           _____________________________________
T: 09/28/20 8334                           FANY Arauz MD              /PMT

## 2021-01-22 ENCOUNTER — HOSPITAL ENCOUNTER (OUTPATIENT)
Dept: HOSPITAL 35 - PAIN | Age: 51
Discharge: HOME | End: 2021-01-22
Payer: COMMERCIAL

## 2021-01-22 VITALS — WEIGHT: 289 LBS | BODY MASS INDEX: 35.19 KG/M2 | HEIGHT: 75.98 IN

## 2021-01-22 VITALS — SYSTOLIC BLOOD PRESSURE: 128 MMHG | DIASTOLIC BLOOD PRESSURE: 78 MMHG

## 2021-01-22 DIAGNOSIS — G89.29: ICD-10-CM

## 2021-01-22 DIAGNOSIS — Z79.891: ICD-10-CM

## 2021-01-22 DIAGNOSIS — F17.210: ICD-10-CM

## 2021-01-22 DIAGNOSIS — Z98.890: ICD-10-CM

## 2021-01-22 DIAGNOSIS — Z79.899: ICD-10-CM

## 2021-01-22 DIAGNOSIS — M54.16: Primary | ICD-10-CM

## 2021-01-22 NOTE — NUR
Pain Clinic Assessment:
 
1. History of Osteoarthritis:
BACK
   History of Rheumatoid Arthritis:
Not Applicable
 
2. Height: 6 ft. 4 in. 193.0 cm.
   Weight: 289.0 lb.  oz. 131.090 kg.
   Patient's BMI: 35.2
 
3. Vital Signs:
   BP: 128/78 Pulse: 78 Resp: 14
   Temp:  02 Sat: 98 ECG Mon:
 
4. Pain Intensity: 7
 
5. Fall Risk:
   Dizziness: N  Needs help standing or walking: N
   Fallen in the last 3 months: N
   Fall risk comments:
 
 
6. Patient on Blood Thinner: None
 
7. History of Hypertension: N
 
8. Opioid Therapy greater than 6 weeks: Y
   Opiate Contract Signed:
 
9. Risk Assessment Tool Provided: LOW RISK 0/3
 
10. Functional Assessment Tool: 48/70
 
11. Recreational Drug Use: Never Drug Type:
    Tobacco Use: Current Some Day Smoker Tobacco Type: Cigars
       Amount or Packs/day: 1 DAILY How Many Years:
    Alcohol Use: Yes  Frequency: Weekly Quant: 1 TO 2

## 2021-03-26 ENCOUNTER — HOSPITAL ENCOUNTER (OUTPATIENT)
Dept: HOSPITAL 35 - PAIN | Age: 51
Discharge: HOME | End: 2021-03-26
Payer: COMMERCIAL

## 2021-03-26 VITALS — BODY MASS INDEX: 34.85 KG/M2 | WEIGHT: 286.2 LBS | HEIGHT: 75.98 IN

## 2021-03-26 VITALS — SYSTOLIC BLOOD PRESSURE: 134 MMHG | DIASTOLIC BLOOD PRESSURE: 81 MMHG

## 2021-03-26 DIAGNOSIS — G89.29: ICD-10-CM

## 2021-03-26 DIAGNOSIS — M54.16: Primary | ICD-10-CM

## 2021-03-26 DIAGNOSIS — F17.210: ICD-10-CM

## 2021-03-26 DIAGNOSIS — Z79.899: ICD-10-CM

## 2021-03-26 DIAGNOSIS — Z98.890: ICD-10-CM

## 2021-03-26 NOTE — NUR
Pain Clinic Assessment:
 
1. History of Osteoarthritis:
BACK
   History of Rheumatoid Arthritis:
Not Applicable
 
2. Height: 6 ft. 4 in. 193.0 cm.
   Weight: 286.2 lb.  oz. 129.820 kg.
   Patient's BMI: 34.9
 
3. Vital Signs:
   BP: 134/81 Pulse: 97 Resp: 18
   Temp:  02 Sat: 97 ECG Mon:
 
4. Pain Intensity: 6-7
 
5. Fall Risk:
   Dizziness: N  Needs help standing or walking: N
   Fallen in the last 3 months: N
   Fall risk comments:
 
 
6. Patient on Blood Thinner: None
 
7. History of Hypertension: N
 
8. Opioid Therapy greater than 6 weeks: Y
   Opiate Contract Signed:
 
9. Risk Assessment Tool Provided: LOW RISK 0/3
 
10. Functional Assessment Tool: 48/70
 
11. Recreational Drug Use: Never Drug Type:
    Tobacco Use: Current Some Day Smoker Tobacco Type:
       Amount or Packs/day:  How Many Years:
    Alcohol Use: Yes  Frequency:  Quant: